# Patient Record
(demographics unavailable — no encounter records)

---

## 2024-10-08 NOTE — PHYSICAL EXAM
[Appropriately responsive] : appropriately responsive [Regular Rate Rhythm] : regular rate rhythm [Soft] : soft [Non-tender] : non-tender [Non-distended] : non-distended [Oriented x3] : oriented x3 [FreeTextEntry5] : nl work of breathing  [Examination Of The Breasts] : a normal appearance [No Masses] : no breast masses were palpable [Labia Majora] : normal [Labia Minora] : normal [IUD String] : an IUD string was noted [Normal] : normal [Uterine Adnexae] : normal

## 2024-10-08 NOTE — HISTORY OF PRESENT ILLNESS
[FreeTextEntry1] : Julia Goldberg 32 year G0 presenting for annual visit. Patient offers no complaints. Denies vaginal bleeding, discharge, urinary symptoms, dyspareunia. Would like IUD removed to try to conceive.   Patient reports that she has regular monthly periods, moderate flow, mild cramping.     Health Care Maintenance: Pap: Due today

## 2024-10-08 NOTE — PLAN
[FreeTextEntry1] : Julia Goldberg 32 year G0 presents for annual well woman visit. No complaints. Desires IUD removal for fertility    #Health Maintenance: - Pap done today - IUD removed uncomplicated, patient desires fertility  - STI panel completed recently, all stacy García MD

## 2024-10-08 NOTE — PROCEDURE
[IUD Removal] : intrauterine device (IUD) removal [Time out performed] : Pre-procedure time out performed.  Patient's name, date of birth and procedure confirmed. [Consent Obtained] : Consent obtained [Fertility Desired] : fertility desired [Risks] : risks [Benefits] : benefits [Alternatives] : alternatives [Patient] : patient [Speculum Placed] : speculum placed [Strings Visualized] : strings visualized [IUD Discarded] : IUD discarded [Tolerated Well] : Patient tolerated the procedure well [No Complications] : no complications

## 2025-07-01 NOTE — COUNSELING
[Vitamins/Supplements] : vitamins/supplements [Pregnancy Options] : pregnancy options [Lab Results] : lab results [Medication Management] : medication management

## 2025-07-01 NOTE — HISTORY OF PRESENT ILLNESS
[FreeTextEntry1] : 33 y/o F presents to determine fetal viability TVUS today shows SLIUP measuring 7w6d (ALDAIR 2/11/26 by [facility] clomid/IUI) with +FH  Pt reports intermittent nausea and vomiting - taking unisom and zofran PRN Pt reports constipation, using prune juice with good effect 1 episode of spotting, denies cramping   OBHx: P0 GYNHx: PCOS,  PMHx: depression/anxiety, HLD  PSHx: rhinoplasty, bilateral inguinal hernia repair as an infant Meds: zoloft, wellbutrin, unisom/B6, PNV, zofran PRN  All: NKDA  SH: neg x3   HM: Pap NILM HPV neg 2025    Pt accepts blood transfusion Pt is accepting of a male physician

## 2025-07-01 NOTE — REVIEW OF SYSTEMS
[Patient Intake Form Reviewed] : Patient intake form was reviewed [Constipation] : constipation [Nausea] : nausea [Negative] : Heme/Lymph

## 2025-07-01 NOTE — PLAN
[FreeTextEntry1] : 31y/o  at 7w6d (ALDAIR 26 by [facility] clomiod/IUI) presents for confirmation of pregnancy   #PNC -1st trimester labs today -NIPS discussed -Prenatal packet provided and reviewed, including rotating call schedule for hospital coverage -Cord collection discussed -Requisition for NT -Start ASA 162mg for PEC prevention -PHQ 9 reviewed - Behavioral Health resources provided -Extensive counseling including but not limited to pregnancy expectations, safe medications, foods to avoid, exercise and stretching    RTO 4 weeks salazar GRIGSBY

## 2025-07-30 NOTE — DISCUSSION/SUMMARY
[FreeTextEntry1] : The significance of nuchal translucency testing was explained to the patient and ultrasound was performed. The sensitivity of the test can be improved by combining with second trimester quad screening. This type of sequential testing minimally increases the false positive rate, but the detection rate for Down syndrome is increased. If the sequential testing is desired, a second stage quad should be drawn and sent. As an alternative, this can be done in our office. If the patient does not desire sequential testing, then a single marker for AFP may be sent to any lab after 15 completed weeks gestation.  Prenatal diagnostic testing is clinically indicated for this patient. The limitations of NIPT testing were discussed with the patient and amniocentesis was noted to remain the gold standard for prenatal diagnostic testing. The significance of NIPT testing was reviewed and offered to the patient which she states she has already done. NT Blood was drawn and the results will be sent to your office in approximately 2 weeks. Sequential screening is recommended between 15-16 weeks. Anatomy scan is recommended at 19-20 weeks.

## 2025-07-30 NOTE — HISTORY OF PRESENT ILLNESS
[FreeTextEntry1] : Patient is a 32 year old P0 presenting at 12w0d seen in my office today for nuchal translucency testing. ALDAIR 2/11/26. The limitations of testing were discussed with the patient and she was informed that this is a screening test. If she desires a diagnostic test like CVS or Amniocentesis, this may be performed.